# Patient Record
Sex: FEMALE | NOT HISPANIC OR LATINO | URBAN - METROPOLITAN AREA
[De-identification: names, ages, dates, MRNs, and addresses within clinical notes are randomized per-mention and may not be internally consistent; named-entity substitution may affect disease eponyms.]

---

## 2017-03-29 ENCOUNTER — EMERGENCY (EMERGENCY)
Facility: HOSPITAL | Age: 62
LOS: 1 days | Discharge: HOME | End: 2017-03-29
Attending: EMERGENCY MEDICINE
Payer: COMMERCIAL

## 2017-03-29 VITALS
TEMPERATURE: 98 F | RESPIRATION RATE: 18 BRPM | DIASTOLIC BLOOD PRESSURE: 110 MMHG | WEIGHT: 179.9 LBS | HEIGHT: 66 IN | HEART RATE: 75 BPM | SYSTOLIC BLOOD PRESSURE: 190 MMHG | OXYGEN SATURATION: 99 %

## 2017-03-29 VITALS
TEMPERATURE: 98 F | OXYGEN SATURATION: 98 % | DIASTOLIC BLOOD PRESSURE: 75 MMHG | RESPIRATION RATE: 18 BRPM | SYSTOLIC BLOOD PRESSURE: 136 MMHG | HEART RATE: 70 BPM

## 2017-03-29 DIAGNOSIS — Z98.89 OTHER SPECIFIED POSTPROCEDURAL STATES: Chronic | ICD-10-CM

## 2017-03-29 DIAGNOSIS — F32.89 OTHER SPECIFIED DEPRESSIVE EPISODES: ICD-10-CM

## 2017-03-29 DIAGNOSIS — R11.0 NAUSEA: ICD-10-CM

## 2017-03-29 DIAGNOSIS — L30.9 DERMATITIS, UNSPECIFIED: ICD-10-CM

## 2017-03-29 DIAGNOSIS — R51 HEADACHE: ICD-10-CM

## 2017-03-29 DIAGNOSIS — R11.10 VOMITING, UNSPECIFIED: ICD-10-CM

## 2017-03-29 DIAGNOSIS — F41.9 ANXIETY DISORDER, UNSPECIFIED: ICD-10-CM

## 2017-03-29 PROCEDURE — 99285 EMERGENCY DEPT VISIT HI MDM: CPT

## 2017-03-29 PROCEDURE — 96375 TX/PRO/DX INJ NEW DRUG ADDON: CPT

## 2017-03-29 PROCEDURE — 96374 THER/PROPH/DIAG INJ IV PUSH: CPT

## 2017-03-29 PROCEDURE — 99284 EMERGENCY DEPT VISIT MOD MDM: CPT | Mod: 25

## 2017-03-29 RX ORDER — METOCLOPRAMIDE HCL 10 MG
10 TABLET ORAL ONCE
Qty: 0 | Refills: 0 | Status: COMPLETED | OUTPATIENT
Start: 2017-03-29 | End: 2017-03-29

## 2017-03-29 RX ORDER — MIRTAZAPINE 45 MG/1
0 TABLET, ORALLY DISINTEGRATING ORAL
Qty: 0 | Refills: 0 | COMMUNITY

## 2017-03-29 RX ORDER — KETOROLAC TROMETHAMINE 30 MG/ML
30 SYRINGE (ML) INJECTION ONCE
Qty: 0 | Refills: 0 | Status: DISCONTINUED | OUTPATIENT
Start: 2017-03-29 | End: 2017-03-29

## 2017-03-29 RX ORDER — RISPERIDONE 4 MG/1
0 TABLET ORAL
Qty: 0 | Refills: 0 | COMMUNITY

## 2017-03-29 RX ORDER — LAMOTRIGINE 25 MG/1
0 TABLET, ORALLY DISINTEGRATING ORAL
Qty: 0 | Refills: 0 | COMMUNITY

## 2017-03-29 RX ORDER — DIPHENHYDRAMINE HCL 50 MG
50 CAPSULE ORAL ONCE
Qty: 0 | Refills: 0 | Status: COMPLETED | OUTPATIENT
Start: 2017-03-29 | End: 2017-03-29

## 2017-03-29 RX ADMIN — Medication 50 MILLIGRAM(S): at 04:01

## 2017-03-29 RX ADMIN — Medication 30 MILLIGRAM(S): at 04:34

## 2017-03-29 RX ADMIN — Medication 10 MILLIGRAM(S): at 04:05

## 2017-03-29 RX ADMIN — Medication 30 MILLIGRAM(S): at 04:04

## 2017-03-29 NOTE — ED PROVIDER NOTE - ENMT, MLM
Airway patent, Nasal mucosa clear. Mouth with normal mucosa. Throat has no vesicles, no oropharyngeal exudates and uvula is midline. Nontender sinuses, neck supple/nontender, full ROM neck without pain.

## 2017-03-29 NOTE — ED PROVIDER NOTE - MEDICAL DECISION MAKING DETAILS
62 y/o F pt w/ acute onset headache and nausea. Similar to prior headaches that pt has had chronically for years. No focal deficits. BP very high, possibly due to pain. Will repeat pressure, give Toradol, Benadryl, Reglan for pain, reassess. 62 y/o F pt w/ acute onset headache and nausea. Similar to prior headaches that pt has had chronically for years. No focal deficits. BP very high, possibly due to pain. Will repeat BP. give Toradol, Benadryl, Reglan for pain, reassess. consider CT if persisting HA and high BP

## 2017-03-29 NOTE — ED PROVIDER NOTE - NS ED MD SCRIBE ATTENDING SCRIBE SECTIONS
PAST MEDICAL/SURGICAL/SOCIAL HISTORY/DISPOSITION/REVIEW OF SYSTEMS/VITAL SIGNS( Pullset)/HIV/PHYSICAL EXAM/HISTORY OF PRESENT ILLNESS

## 2017-03-29 NOTE — ED PROVIDER NOTE - OBJECTIVE STATEMENT
60 y/o F pt w/ PMHx of Anxiety, Migraines, and Depression presents to ED c/o migraine x2 hours today. Pt describes her headache as "ice picks". Pt also notes 1 episode of vomiting today. Pt denies fever, recent injury, cough, rhinorrhea, congestion, or any other complaints. Pt notes that she gets migraines about every 3 months for years; pt reports that this headache is similar to past episodes. Pt states that she just finished a 4 day detox from alcohol yesterday. NKDA. 60 y/o F pt w/ PMHx of Anxiety, Migraines, and Depression presents to ED c/o headache x last 2 hours today. Pt describes her headache as "ice picks", to R forehead area. Pt also notes 1 episode of vomiting today. Pt denies fever, recent injury, cough, rhinorrhea, congestion, or any other complaints. Pt notes that she gets headache about every 3 months for years; pt reports that this headache is similar to past episodes. was told she had cluster HAs. Pt states that she just finished a 4 day detox from alcohol yesterday. NKDA.

## 2017-03-29 NOTE — ED ADULT NURSE NOTE - OBJECTIVE STATEMENT
Pt. is aox3 came to er via private car c/o migraine. As per pt migraine started tonight. "as if axe was chopping her head. pt took motrin without relief. to be seen by attending

## 2017-03-30 ENCOUNTER — EMERGENCY (EMERGENCY)
Facility: HOSPITAL | Age: 62
LOS: 1 days | Discharge: HOME | End: 2017-03-30
Attending: EMERGENCY MEDICINE
Payer: COMMERCIAL

## 2017-03-30 VITALS
OXYGEN SATURATION: 100 % | HEART RATE: 73 BPM | SYSTOLIC BLOOD PRESSURE: 170 MMHG | DIASTOLIC BLOOD PRESSURE: 90 MMHG | TEMPERATURE: 98 F | WEIGHT: 179.9 LBS | HEIGHT: 66 IN | RESPIRATION RATE: 20 BRPM

## 2017-03-30 VITALS
HEART RATE: 71 BPM | SYSTOLIC BLOOD PRESSURE: 156 MMHG | TEMPERATURE: 97 F | DIASTOLIC BLOOD PRESSURE: 97 MMHG | RESPIRATION RATE: 18 BRPM | OXYGEN SATURATION: 98 %

## 2017-03-30 DIAGNOSIS — Z98.89 OTHER SPECIFIED POSTPROCEDURAL STATES: Chronic | ICD-10-CM

## 2017-03-30 DIAGNOSIS — R51 HEADACHE: ICD-10-CM

## 2017-03-30 DIAGNOSIS — L30.9 DERMATITIS, UNSPECIFIED: ICD-10-CM

## 2017-03-30 DIAGNOSIS — F41.9 ANXIETY DISORDER, UNSPECIFIED: ICD-10-CM

## 2017-03-30 DIAGNOSIS — Z86.69 PERSONAL HISTORY OF OTHER DISEASES OF THE NERVOUS SYSTEM AND SENSE ORGANS: ICD-10-CM

## 2017-03-30 DIAGNOSIS — F32.9 MAJOR DEPRESSIVE DISORDER, SINGLE EPISODE, UNSPECIFIED: ICD-10-CM

## 2017-03-30 PROCEDURE — 99285 EMERGENCY DEPT VISIT HI MDM: CPT | Mod: 25

## 2017-03-30 PROCEDURE — 99284 EMERGENCY DEPT VISIT MOD MDM: CPT | Mod: 25

## 2017-03-30 PROCEDURE — 96375 TX/PRO/DX INJ NEW DRUG ADDON: CPT

## 2017-03-30 PROCEDURE — 96374 THER/PROPH/DIAG INJ IV PUSH: CPT

## 2017-03-30 RX ORDER — KETOROLAC TROMETHAMINE 30 MG/ML
30 SYRINGE (ML) INJECTION ONCE
Qty: 0 | Refills: 0 | Status: DISCONTINUED | OUTPATIENT
Start: 2017-03-30 | End: 2017-03-30

## 2017-03-30 RX ORDER — METOCLOPRAMIDE HCL 10 MG
10 TABLET ORAL ONCE
Qty: 0 | Refills: 0 | Status: COMPLETED | OUTPATIENT
Start: 2017-03-30 | End: 2017-03-30

## 2017-03-30 RX ORDER — SODIUM CHLORIDE 9 MG/ML
1000 INJECTION INTRAMUSCULAR; INTRAVENOUS; SUBCUTANEOUS ONCE
Qty: 0 | Refills: 0 | Status: COMPLETED | OUTPATIENT
Start: 2017-03-30 | End: 2017-03-30

## 2017-03-30 RX ORDER — DIPHENHYDRAMINE HCL 50 MG
25 CAPSULE ORAL ONCE
Qty: 0 | Refills: 0 | Status: COMPLETED | OUTPATIENT
Start: 2017-03-30 | End: 2017-03-30

## 2017-03-30 RX ADMIN — Medication 25 MILLIGRAM(S): at 06:32

## 2017-03-30 RX ADMIN — SODIUM CHLORIDE 1000 MILLILITER(S): 9 INJECTION INTRAMUSCULAR; INTRAVENOUS; SUBCUTANEOUS at 06:34

## 2017-03-30 RX ADMIN — Medication 30 MILLIGRAM(S): at 06:34

## 2017-03-30 RX ADMIN — Medication 10 MILLIGRAM(S): at 06:34

## 2017-03-30 NOTE — ED PROVIDER NOTE - OBJECTIVE STATEMENT
60 y/o F pt w/ PMHx of Anxiety, Migraines, and Depression presents to ED c/o headache since waking. She notes a similar headache yesterday and many similar headaches in the past. She was told it was a migraine vs cluster headaches. Pt describes her headache as "ice picks", to R forehead area, associated nausea, no vomiting. It started several hours ago, nothing makes it better or worse. This is not the worst headache of her life, no syncope, no exertional component.

## 2017-03-30 NOTE — ED ADULT NURSE NOTE - CAS EDN DISCHARGE ASSESSMENT
No adverse reaction to first time med in ED/Alert and oriented to person, place and time/Symptoms improved

## 2017-03-30 NOTE — ED PROVIDER NOTE - MEDICAL DECISION MAKING DETAILS
60yo with frontal headache, similar to prior headaches in the past. Chart reviewed pt describes headache exactly the same as prior. Given that it occurred at same time, location, possible cluster headache. Will trial oxygen as well as migraine medications. Given return I recommended CT however pt refused. Not consistent with SAH Pt has a neurologist, she was encouraged to call them to make an appointment within 1 week. She was instructed to return with any worsening or concerning symptoms.

## 2017-03-30 NOTE — ED PROVIDER NOTE - PROGRESS NOTE DETAILS
pt pain free, offered CT given multiple recent headaches, pt refused, given neurology f/u as pt wants a new neurologist, instructed to return with any concerning symptoms.

## 2017-03-30 NOTE — ED PROVIDER NOTE - NEUROLOGICAL, MLM
Alert and oriented, no focal deficits, no motor or sensory deficits. CN II-XII intact, normal gait, no neck stiffness.

## 2017-04-01 ENCOUNTER — TRANSCRIPTION ENCOUNTER (OUTPATIENT)
Age: 62
End: 2017-04-01

## 2018-12-12 NOTE — ED PROVIDER NOTE - CROS ED NEURO POS
[facility] Call Center    Phone Message    May a detailed message be left on voicemail: yes    Reason for Call: Medication Refill Request    Has the patient contacted the pharmacy for the refill? Yes   Name of medication being requested: LYRICA 100 MG capsule  Provider who prescribed the medication: Dr. Merritt   Pharmacy: [facility]  Date medication is needed: asap          Action Taken: Message routed to:  [facility] ([facility]     [facility]

## 2019-01-30 NOTE — ED PROVIDER NOTE - CRANIAL NERVE AND PUPILLARY EXAM
corneal reflex intact/tongue is midline/cranial nerves 2-12 intact/central and peripheral vision intact/extra-ocular movements intact
Pt educated regarding fall prevention and home/hospital safety. Pt educated on use of AE/DME recommended for safety & the need to call for assistance. Role of OT and pt goals discussed. Pt educated re: d/c plan & DME needs (SW/case mgmt notified via [employer]MR)./able to follow multistep instructions/100% of the time

## 2022-02-17 NOTE — ED PROVIDER NOTE - HEAD, MLM
Patient notified via [email or web address].
Please notify the patient of normal results.  
Head is atraumatic. Head shape is symmetrical.
